# Patient Record
Sex: FEMALE | Race: WHITE | NOT HISPANIC OR LATINO | Employment: OTHER | URBAN - METROPOLITAN AREA
[De-identification: names, ages, dates, MRNs, and addresses within clinical notes are randomized per-mention and may not be internally consistent; named-entity substitution may affect disease eponyms.]

---

## 2017-05-15 ENCOUNTER — FOLLOW UP (OUTPATIENT)
Dept: URBAN - METROPOLITAN AREA CLINIC 26 | Facility: CLINIC | Age: 72
End: 2017-05-15

## 2017-05-15 VITALS — SYSTOLIC BLOOD PRESSURE: 131 MMHG | HEIGHT: 55 IN | HEART RATE: 71 BPM | DIASTOLIC BLOOD PRESSURE: 82 MMHG

## 2017-05-15 DIAGNOSIS — E11.3313: ICD-10-CM

## 2017-05-15 DIAGNOSIS — H40.013: ICD-10-CM

## 2017-05-15 DIAGNOSIS — H43.811: ICD-10-CM

## 2017-05-15 DIAGNOSIS — Z79.4: ICD-10-CM

## 2017-05-15 DIAGNOSIS — H35.3132: ICD-10-CM

## 2017-05-15 DIAGNOSIS — H43.393: ICD-10-CM

## 2017-05-15 PROCEDURE — 1036F TOBACCO NON-USER: CPT

## 2017-05-15 PROCEDURE — G8427 DOCREV CUR MEDS BY ELIG CLIN: HCPCS

## 2017-05-15 PROCEDURE — 92250 FUNDUS PHOTOGRAPHY W/I&R: CPT

## 2017-05-15 PROCEDURE — 92014 COMPRE OPH EXAM EST PT 1/>: CPT

## 2017-05-15 PROCEDURE — 2019F DILATED MACUL EXAM DONE: CPT

## 2017-05-15 PROCEDURE — 92134 CPTRZ OPH DX IMG PST SGM RTA: CPT

## 2017-05-15 PROCEDURE — G8397 DIL MACULA/FUNDUS EXAM/W DOC: HCPCS

## 2017-05-15 PROCEDURE — 2021F DILAT MACULAR EXAM DONE: CPT

## 2017-05-15 PROCEDURE — 5010F MACUL RESULT PHY/QHP MNG DM: CPT

## 2017-05-15 PROCEDURE — 4177F TALK PT/CRGVR RE AREDS PREV: CPT

## 2017-05-15 PROCEDURE — 2022F DILAT RTA XM EVC RTNOPTHY: CPT

## 2017-05-15 ASSESSMENT — TONOMETRY
OS_IOP_MMHG: 12
OD_IOP_MMHG: 16

## 2017-05-15 ASSESSMENT — VISUAL ACUITY
OS_CC: 20/30-
OD_CC: 20/30-

## 2017-05-22 NOTE — PROCEDURE NOTE: CLINICAL
Prior to treatment, the risks/benefits/alternatives were discussed. The patient wished to proceed with procedure. Temporary collagen plugs were inserted. Patient tolerated procedure well. There were no complications. Post procedure instructions given.  0.4mm Kerri GOODWIN

## 2017-11-16 ENCOUNTER — FOLLOW UP (OUTPATIENT)
Dept: URBAN - METROPOLITAN AREA CLINIC 26 | Facility: CLINIC | Age: 72
End: 2017-11-16

## 2017-11-16 VITALS
HEIGHT: 61 IN | SYSTOLIC BLOOD PRESSURE: 180 MMHG | BODY MASS INDEX: 35.87 KG/M2 | DIASTOLIC BLOOD PRESSURE: 76 MMHG | HEART RATE: 75 BPM | WEIGHT: 190 LBS

## 2017-11-16 DIAGNOSIS — H35.3132: ICD-10-CM

## 2017-11-16 DIAGNOSIS — H43.393: ICD-10-CM

## 2017-11-16 DIAGNOSIS — H43.811: ICD-10-CM

## 2017-11-16 DIAGNOSIS — H40.013: ICD-10-CM

## 2017-11-16 DIAGNOSIS — Z79.4: ICD-10-CM

## 2017-11-16 DIAGNOSIS — E11.3313: ICD-10-CM

## 2017-11-16 PROCEDURE — 4177F TALK PT/CRGVR RE AREDS PREV: CPT

## 2017-11-16 PROCEDURE — 92250 FUNDUS PHOTOGRAPHY W/I&R: CPT

## 2017-11-16 PROCEDURE — G8427 DOCREV CUR MEDS BY ELIG CLIN: HCPCS

## 2017-11-16 PROCEDURE — 5010F MACUL RESULT PHY/QHP MNG DM: CPT

## 2017-11-16 PROCEDURE — 2021F DILAT MACULAR EXAM DONE: CPT

## 2017-11-16 PROCEDURE — 1036F TOBACCO NON-USER: CPT

## 2017-11-16 PROCEDURE — G8417 CALC BMI ABV UP PARAM F/U: HCPCS

## 2017-11-16 PROCEDURE — 92014 COMPRE OPH EXAM EST PT 1/>: CPT

## 2017-11-16 PROCEDURE — G8397 DIL MACULA/FUNDUS EXAM/W DOC: HCPCS

## 2017-11-16 PROCEDURE — 2019F DILATED MACUL EXAM DONE: CPT

## 2017-11-16 PROCEDURE — 2022F DILAT RTA XM EVC RTNOPTHY: CPT

## 2017-11-16 PROCEDURE — 92134 CPTRZ OPH DX IMG PST SGM RTA: CPT

## 2017-11-16 ASSESSMENT — VISUAL ACUITY
OS_CC: 20/25-1
OD_CC: 20/25-1

## 2017-11-16 ASSESSMENT — TONOMETRY
OS_IOP_MMHG: 19
OD_IOP_MMHG: 20

## 2018-05-21 NOTE — PROCEDURE NOTE: CLINICAL
PROCEDURE NOTE: Punctal Plugs, Lily Kidd ([id number], [id number]) #2 Bilateral Lower Lids. Diagnosis: RENATO. Quintess 0.4MM BLL.

## 2018-09-11 ENCOUNTER — FOLLOW UP (OUTPATIENT)
Dept: URBAN - METROPOLITAN AREA CLINIC 26 | Facility: CLINIC | Age: 73
End: 2018-09-11

## 2018-09-11 VITALS
WEIGHT: 197 LBS | DIASTOLIC BLOOD PRESSURE: 78 MMHG | SYSTOLIC BLOOD PRESSURE: 136 MMHG | HEART RATE: 71 BPM | BODY MASS INDEX: 37.19 KG/M2 | HEIGHT: 61 IN

## 2018-09-11 DIAGNOSIS — H35.3112: ICD-10-CM

## 2018-09-11 DIAGNOSIS — H43.393: ICD-10-CM

## 2018-09-11 DIAGNOSIS — H35.3221: ICD-10-CM

## 2018-09-11 DIAGNOSIS — H04.123: ICD-10-CM

## 2018-09-11 DIAGNOSIS — H43.811: ICD-10-CM

## 2018-09-11 DIAGNOSIS — E11.3313: ICD-10-CM

## 2018-09-11 DIAGNOSIS — H02.836: ICD-10-CM

## 2018-09-11 DIAGNOSIS — H02.833: ICD-10-CM

## 2018-09-11 DIAGNOSIS — H40.013: ICD-10-CM

## 2018-09-11 DIAGNOSIS — Z79.4: ICD-10-CM

## 2018-09-11 PROCEDURE — 92250 FUNDUS PHOTOGRAPHY W/I&R: CPT

## 2018-09-11 PROCEDURE — 67028 INJECTION EYE DRUG: CPT

## 2018-09-11 PROCEDURE — 92014 COMPRE OPH EXAM EST PT 1/>: CPT

## 2018-09-11 PROCEDURE — 92134 CPTRZ OPH DX IMG PST SGM RTA: CPT

## 2018-09-11 PROCEDURE — 92071 CONTACT LENS FITTING FOR TX: CPT

## 2018-09-11 ASSESSMENT — TONOMETRY
OS_IOP_MMHG: 15
OD_IOP_MMHG: 15

## 2018-09-11 ASSESSMENT — VISUAL ACUITY
OD_PH: 20/25-2
OD_SC: 20/40+2
OS_SC: 20/30-2

## 2018-09-12 ENCOUNTER — PROCEDURE ONLY (OUTPATIENT)
Dept: URBAN - METROPOLITAN AREA CLINIC 26 | Facility: CLINIC | Age: 73
End: 2018-09-12

## 2018-09-12 DIAGNOSIS — E11.3313: ICD-10-CM

## 2018-09-12 DIAGNOSIS — H40.013: ICD-10-CM

## 2018-09-12 DIAGNOSIS — H43.393: ICD-10-CM

## 2018-09-12 DIAGNOSIS — H02.836: ICD-10-CM

## 2018-09-12 DIAGNOSIS — H43.811: ICD-10-CM

## 2018-09-12 DIAGNOSIS — H35.3221: ICD-10-CM

## 2018-09-12 DIAGNOSIS — Z79.4: ICD-10-CM

## 2018-09-12 DIAGNOSIS — H04.123: ICD-10-CM

## 2018-09-12 DIAGNOSIS — H02.833: ICD-10-CM

## 2018-09-12 DIAGNOSIS — H35.3112: ICD-10-CM

## 2018-09-12 PROCEDURE — 99211 OFF/OP EST MAY X REQ PHY/QHP: CPT

## 2018-09-12 ASSESSMENT — VISUAL ACUITY: OS_SC: 20/30-2

## 2018-10-15 ENCOUNTER — FOLLOW UP (OUTPATIENT)
Dept: URBAN - METROPOLITAN AREA CLINIC 26 | Facility: CLINIC | Age: 73
End: 2018-10-15

## 2018-10-15 DIAGNOSIS — H43.811: ICD-10-CM

## 2018-10-15 DIAGNOSIS — H04.123: ICD-10-CM

## 2018-10-15 DIAGNOSIS — Z79.4: ICD-10-CM

## 2018-10-15 DIAGNOSIS — H43.393: ICD-10-CM

## 2018-10-15 DIAGNOSIS — E11.3313: ICD-10-CM

## 2018-10-15 DIAGNOSIS — H35.3221: ICD-10-CM

## 2018-10-15 DIAGNOSIS — H35.3112: ICD-10-CM

## 2018-10-15 DIAGNOSIS — H40.013: ICD-10-CM

## 2018-10-15 PROCEDURE — 92250 FUNDUS PHOTOGRAPHY W/I&R: CPT

## 2018-10-15 PROCEDURE — 67028 INJECTION EYE DRUG: CPT

## 2018-10-15 PROCEDURE — 92014 COMPRE OPH EXAM EST PT 1/>: CPT

## 2018-10-15 PROCEDURE — 92134 CPTRZ OPH DX IMG PST SGM RTA: CPT

## 2018-10-15 PROCEDURE — 92071 CONTACT LENS FITTING FOR TX: CPT

## 2018-10-15 ASSESSMENT — VISUAL ACUITY
OS_CC: 20/25
OD_CC: 20/30

## 2018-10-15 ASSESSMENT — TONOMETRY
OS_IOP_MMHG: 14
OD_IOP_MMHG: 13

## 2018-10-16 ENCOUNTER — CLINIC PROCEDURE ONLY (OUTPATIENT)
Dept: URBAN - METROPOLITAN AREA CLINIC 26 | Facility: CLINIC | Age: 73
End: 2018-10-16

## 2018-10-16 DIAGNOSIS — H35.3112: ICD-10-CM

## 2018-10-16 DIAGNOSIS — H43.811: ICD-10-CM

## 2018-10-16 DIAGNOSIS — H43.393: ICD-10-CM

## 2018-10-16 DIAGNOSIS — H35.3221: ICD-10-CM

## 2018-10-16 DIAGNOSIS — Z79.4: ICD-10-CM

## 2018-10-16 DIAGNOSIS — H40.013: ICD-10-CM

## 2018-10-16 DIAGNOSIS — E11.3313: ICD-10-CM

## 2018-10-16 PROCEDURE — 99212 OFFICE O/P EST SF 10 MIN: CPT

## 2018-10-16 ASSESSMENT — VISUAL ACUITY: OS_CC: 20/30-2

## 2018-11-19 NOTE — PROCEDURE NOTE: CLINICAL
PROCEDURE NOTE: Punctal Plugs, Wilfrid Verma ([id number], [id number]) #2 Bilateral Lower Lids. Diagnosis: RENATO. i gtt Proparacaine OU; i gtt Moxifloxacin OU 0.5mm BLL.

## 2018-12-03 ENCOUNTER — CLINICAL PROCEDURE AND DIAGNOSTIC TESTING ONLY (OUTPATIENT)
Dept: URBAN - METROPOLITAN AREA CLINIC 26 | Facility: CLINIC | Age: 73
End: 2018-12-03

## 2018-12-03 DIAGNOSIS — H35.3112: ICD-10-CM

## 2018-12-03 DIAGNOSIS — H04.123: ICD-10-CM

## 2018-12-03 DIAGNOSIS — H35.3221: ICD-10-CM

## 2018-12-03 PROCEDURE — 67028 INJECTION EYE DRUG: CPT

## 2018-12-03 PROCEDURE — 92071 CONTACT LENS FITTING FOR TX: CPT

## 2018-12-03 PROCEDURE — 92250 FUNDUS PHOTOGRAPHY W/I&R: CPT

## 2018-12-03 ASSESSMENT — TONOMETRY: OS_IOP_MMHG: 15

## 2018-12-03 ASSESSMENT — VISUAL ACUITY: OS_CC: 20/25-1

## 2018-12-04 ENCOUNTER — PROCEDURE ONLY (OUTPATIENT)
Dept: URBAN - METROPOLITAN AREA CLINIC 26 | Facility: CLINIC | Age: 73
End: 2018-12-04

## 2018-12-04 DIAGNOSIS — E11.3313: ICD-10-CM

## 2018-12-04 DIAGNOSIS — Z79.4: ICD-10-CM

## 2018-12-04 DIAGNOSIS — H35.3112: ICD-10-CM

## 2018-12-04 DIAGNOSIS — H02.833: ICD-10-CM

## 2018-12-04 DIAGNOSIS — H43.811: ICD-10-CM

## 2018-12-04 DIAGNOSIS — H04.123: ICD-10-CM

## 2018-12-04 DIAGNOSIS — H35.3221: ICD-10-CM

## 2018-12-04 DIAGNOSIS — H02.836: ICD-10-CM

## 2018-12-04 DIAGNOSIS — H40.013: ICD-10-CM

## 2018-12-04 DIAGNOSIS — H43.393: ICD-10-CM

## 2018-12-04 PROCEDURE — 99212 OFFICE O/P EST SF 10 MIN: CPT

## 2018-12-04 ASSESSMENT — VISUAL ACUITY: OS_CC: 20/30-2

## 2019-01-28 ENCOUNTER — FOLLOW UP (OUTPATIENT)
Dept: URBAN - METROPOLITAN AREA CLINIC 26 | Facility: CLINIC | Age: 74
End: 2019-01-28

## 2019-01-28 DIAGNOSIS — H04.123: ICD-10-CM

## 2019-01-28 DIAGNOSIS — H43.811: ICD-10-CM

## 2019-01-28 DIAGNOSIS — E11.3313: ICD-10-CM

## 2019-01-28 DIAGNOSIS — H43.393: ICD-10-CM

## 2019-01-28 DIAGNOSIS — Z79.4: ICD-10-CM

## 2019-01-28 DIAGNOSIS — H02.836: ICD-10-CM

## 2019-01-28 DIAGNOSIS — H35.3112: ICD-10-CM

## 2019-01-28 DIAGNOSIS — H02.833: ICD-10-CM

## 2019-01-28 DIAGNOSIS — H40.013: ICD-10-CM

## 2019-01-28 DIAGNOSIS — H35.3221: ICD-10-CM

## 2019-01-28 PROCEDURE — 92071 CONTACT LENS FITTING FOR TX: CPT

## 2019-01-28 PROCEDURE — 92134 CPTRZ OPH DX IMG PST SGM RTA: CPT

## 2019-01-28 PROCEDURE — 67028 INJECTION EYE DRUG: CPT

## 2019-01-28 PROCEDURE — 92014 COMPRE OPH EXAM EST PT 1/>: CPT

## 2019-01-28 PROCEDURE — 92250 FUNDUS PHOTOGRAPHY W/I&R: CPT

## 2019-01-28 ASSESSMENT — VISUAL ACUITY
OS_CC: 20/25-2
OD_CC: 20/25-2

## 2019-01-28 ASSESSMENT — TONOMETRY
OD_IOP_MMHG: 14
OS_IOP_MMHG: 14

## 2019-04-01 ENCOUNTER — FOLLOW UP AND POST INJECTION EVALUATION (OUTPATIENT)
Dept: URBAN - METROPOLITAN AREA CLINIC 26 | Facility: CLINIC | Age: 74
End: 2019-04-01

## 2019-04-01 DIAGNOSIS — E11.3313: ICD-10-CM

## 2019-04-01 DIAGNOSIS — H04.123: ICD-10-CM

## 2019-04-01 DIAGNOSIS — H35.3221: ICD-10-CM

## 2019-04-01 DIAGNOSIS — H35.3112: ICD-10-CM

## 2019-04-01 PROCEDURE — 67028 INJECTION EYE DRUG: CPT

## 2019-04-01 PROCEDURE — 92071 CONTACT LENS FITTING FOR TX: CPT

## 2019-04-01 PROCEDURE — 92134 CPTRZ OPH DX IMG PST SGM RTA: CPT

## 2019-04-01 PROCEDURE — 92250 FUNDUS PHOTOGRAPHY W/I&R: CPT

## 2019-04-01 ASSESSMENT — VISUAL ACUITY: OS_CC: 20/30

## 2019-04-01 ASSESSMENT — TONOMETRY: OS_IOP_MMHG: 18

## 2019-05-20 NOTE — PROCEDURE NOTE: CLINICAL
PROCEDURE NOTE: Punctal Plugs, Seb Lpoez ([id number], [id number]) #2 Bilateral Lower Lids. Diagnosis: RENATO.

## 2019-06-03 ENCOUNTER — CLINICAL PROCEDURE AND DIAGNOSTIC TESTING ONLY (OUTPATIENT)
Dept: URBAN - METROPOLITAN AREA CLINIC 26 | Facility: CLINIC | Age: 74
End: 2019-06-03

## 2019-06-03 DIAGNOSIS — H35.3112: ICD-10-CM

## 2019-06-03 DIAGNOSIS — H35.3221: ICD-10-CM

## 2019-06-03 PROCEDURE — 92250 FUNDUS PHOTOGRAPHY W/I&R: CPT

## 2019-06-03 PROCEDURE — 67028 INJECTION EYE DRUG: CPT

## 2019-06-03 PROCEDURE — 92134 CPTRZ OPH DX IMG PST SGM RTA: CPT

## 2019-06-03 ASSESSMENT — VISUAL ACUITY: OS_CC: 20/30+2

## 2019-06-03 ASSESSMENT — TONOMETRY: OS_IOP_MMHG: 12

## 2019-08-08 ENCOUNTER — FOLLOW UP (OUTPATIENT)
Dept: URBAN - METROPOLITAN AREA CLINIC 26 | Facility: CLINIC | Age: 74
End: 2019-08-08

## 2019-08-08 DIAGNOSIS — H43.393: ICD-10-CM

## 2019-08-08 DIAGNOSIS — Z79.4: ICD-10-CM

## 2019-08-08 DIAGNOSIS — H40.013: ICD-10-CM

## 2019-08-08 DIAGNOSIS — H35.3221: ICD-10-CM

## 2019-08-08 DIAGNOSIS — H43.811: ICD-10-CM

## 2019-08-08 DIAGNOSIS — H35.3112: ICD-10-CM

## 2019-08-08 DIAGNOSIS — E11.3313: ICD-10-CM

## 2019-08-08 PROCEDURE — 92014 COMPRE OPH EXAM EST PT 1/>: CPT

## 2019-08-08 PROCEDURE — 92134 CPTRZ OPH DX IMG PST SGM RTA: CPT

## 2019-08-08 PROCEDURE — 92250 FUNDUS PHOTOGRAPHY W/I&R: CPT

## 2019-08-08 PROCEDURE — 67028 INJECTION EYE DRUG: CPT

## 2019-08-08 ASSESSMENT — TONOMETRY
OD_IOP_MMHG: 18
OS_IOP_MMHG: 19

## 2019-08-08 ASSESSMENT — VISUAL ACUITY
OS_CC: 20/30
OD_PH: 20/30
OD_CC: 20/40

## 2019-10-03 ENCOUNTER — FOLLOW UP AND POST INJECTION EVALUATION (OUTPATIENT)
Dept: URBAN - METROPOLITAN AREA CLINIC 26 | Facility: CLINIC | Age: 74
End: 2019-10-03

## 2019-10-03 DIAGNOSIS — H40.013: ICD-10-CM

## 2019-10-03 DIAGNOSIS — Z79.4: ICD-10-CM

## 2019-10-03 DIAGNOSIS — H43.811: ICD-10-CM

## 2019-10-03 DIAGNOSIS — H35.3231: ICD-10-CM

## 2019-10-03 DIAGNOSIS — H43.393: ICD-10-CM

## 2019-10-03 DIAGNOSIS — E11.3313: ICD-10-CM

## 2019-10-03 PROCEDURE — 92250 FUNDUS PHOTOGRAPHY W/I&R: CPT

## 2019-10-03 PROCEDURE — 92134 CPTRZ OPH DX IMG PST SGM RTA: CPT

## 2019-10-03 PROCEDURE — 67028 INJECTION EYE DRUG: CPT

## 2019-10-03 PROCEDURE — 92014 COMPRE OPH EXAM EST PT 1/>: CPT

## 2019-10-03 PROCEDURE — J2778* LUCENTIS 0.1MG

## 2019-10-03 ASSESSMENT — VISUAL ACUITY: OS_CC: 20/30-2

## 2019-10-03 ASSESSMENT — TONOMETRY: OS_IOP_MMHG: 15

## 2019-11-20 NOTE — PROCEDURE NOTE: CLINICAL
PROCEDURE NOTE: Punctal Plugs, Magen Buckley ([id number], [id number]) #2 Bilateral Lower Lids. Diagnosis: RENATO. 0.5mm QUINTESS BLL.

## 2019-11-26 ENCOUNTER — CLINICAL PROCEDURE AND DIAGNOSTIC TESTING ONLY (OUTPATIENT)
Dept: URBAN - METROPOLITAN AREA CLINIC 26 | Facility: CLINIC | Age: 74
End: 2019-11-26

## 2019-11-26 DIAGNOSIS — H35.3231: ICD-10-CM

## 2019-11-26 PROCEDURE — 92134 CPTRZ OPH DX IMG PST SGM RTA: CPT

## 2019-11-26 PROCEDURE — J2778* LUCENTIS 0.1MG

## 2019-11-26 PROCEDURE — 67028 INJECTION EYE DRUG: CPT

## 2019-11-26 PROCEDURE — 92250 FUNDUS PHOTOGRAPHY W/I&R: CPT

## 2019-11-26 ASSESSMENT — VISUAL ACUITY
OS_CC: 20/30-2
OD_CC: 20/30-1

## 2019-11-26 ASSESSMENT — TONOMETRY
OS_IOP_MMHG: 15
OD_IOP_MMHG: 16

## 2020-01-09 ENCOUNTER — FOLLOW UP AND POST INJECTION EVALUATION (OUTPATIENT)
Dept: URBAN - METROPOLITAN AREA CLINIC 26 | Facility: CLINIC | Age: 75
End: 2020-01-09

## 2020-01-09 VITALS — HEIGHT: 61 IN | WEIGHT: 185 LBS | BODY MASS INDEX: 34.93 KG/M2

## 2020-01-09 DIAGNOSIS — H35.3231: ICD-10-CM

## 2020-01-09 DIAGNOSIS — H43.811: ICD-10-CM

## 2020-01-09 DIAGNOSIS — Z79.4: ICD-10-CM

## 2020-01-09 DIAGNOSIS — H43.393: ICD-10-CM

## 2020-01-09 DIAGNOSIS — H40.013: ICD-10-CM

## 2020-01-09 DIAGNOSIS — E11.3313: ICD-10-CM

## 2020-01-09 PROCEDURE — 67028 INJECTION EYE DRUG: CPT

## 2020-01-09 PROCEDURE — J2778* LUCENTIS 0.1MG

## 2020-01-09 PROCEDURE — 92014 COMPRE OPH EXAM EST PT 1/>: CPT

## 2020-01-09 PROCEDURE — 92250 FUNDUS PHOTOGRAPHY W/I&R: CPT

## 2020-01-09 PROCEDURE — 92134 CPTRZ OPH DX IMG PST SGM RTA: CPT

## 2020-01-09 ASSESSMENT — VISUAL ACUITY
OD_CC: 20/20-2
OS_CC: 20/25-2

## 2020-01-09 ASSESSMENT — TONOMETRY
OS_IOP_MMHG: 14
OD_IOP_MMHG: 16

## 2020-02-27 ENCOUNTER — CLINICAL PROCEDURE AND DIAGNOSTIC TESTING ONLY (OUTPATIENT)
Dept: URBAN - METROPOLITAN AREA CLINIC 26 | Facility: CLINIC | Age: 75
End: 2020-02-27

## 2020-02-27 DIAGNOSIS — E11.3313: ICD-10-CM

## 2020-02-27 DIAGNOSIS — H35.3231: ICD-10-CM

## 2020-02-27 PROCEDURE — 92134 CPTRZ OPH DX IMG PST SGM RTA: CPT

## 2020-02-27 PROCEDURE — J2778* LUCENTIS 0.1MG

## 2020-02-27 PROCEDURE — 92250 FUNDUS PHOTOGRAPHY W/I&R: CPT

## 2020-02-27 PROCEDURE — 67028 INJECTION EYE DRUG: CPT

## 2020-02-27 ASSESSMENT — VISUAL ACUITY
OD_CC: 20/25-2
OS_CC: 20/30-1

## 2020-02-27 ASSESSMENT — TONOMETRY
OD_IOP_MMHG: 18
OS_IOP_MMHG: 17

## 2020-03-09 ENCOUNTER — FOLLOW UP AND POST INJECTION EVALUATION (OUTPATIENT)
Dept: URBAN - METROPOLITAN AREA CLINIC 26 | Facility: CLINIC | Age: 75
End: 2020-03-09

## 2020-03-09 DIAGNOSIS — H43.811: ICD-10-CM

## 2020-03-09 DIAGNOSIS — H02.833: ICD-10-CM

## 2020-03-09 DIAGNOSIS — H43.393: ICD-10-CM

## 2020-03-09 DIAGNOSIS — H02.836: ICD-10-CM

## 2020-03-09 DIAGNOSIS — H57.13: ICD-10-CM

## 2020-03-09 DIAGNOSIS — H35.3231: ICD-10-CM

## 2020-03-09 DIAGNOSIS — H04.123: ICD-10-CM

## 2020-03-09 DIAGNOSIS — H40.013: ICD-10-CM

## 2020-03-09 DIAGNOSIS — Z79.4: ICD-10-CM

## 2020-03-09 DIAGNOSIS — E11.3313: ICD-10-CM

## 2020-03-09 PROCEDURE — 92012 INTRM OPH EXAM EST PATIENT: CPT

## 2020-03-09 ASSESSMENT — VISUAL ACUITY
OS_CC: 20/30+2
OD_CC: 20/25-2

## 2020-05-20 NOTE — PROCEDURE NOTE: CLINICAL
PROCEDURE NOTE: Punctal Plugs, Heather Mejia ([id number], [id number]) #2 Bilateral Lower Lids. Prior to treatment, the risks/benefits/alternatives were discussed. The patient wished to proceed with procedure. Temporary collagen plugs were inserted. Patient tolerated procedure well. There were no complications. Post procedure instructions given. Kerri 0.4mm BLL.

## 2020-10-06 ENCOUNTER — OFFICE VISIT (OUTPATIENT)
Dept: URBAN - METROPOLITAN AREA CLINIC 7 | Facility: CLINIC | Age: 75
End: 2020-10-06

## 2020-11-04 ENCOUNTER — OFFICE VISIT (OUTPATIENT)
Dept: URBAN - METROPOLITAN AREA SURGERY CENTER 5 | Facility: SURGERY CENTER | Age: 75
End: 2020-11-04

## 2020-12-08 NOTE — PROCEDURE NOTE: CLINICAL
PROCEDURE NOTE: Punctal Plugs, Lily Kidd ([id number], [id number]) #2 Bilateral Lower Lids. Prior to treatment, the risks/benefits/alternatives were discussed. The patient wished to proceed with procedure. Temporary collagen plugs were inserted. Patient tolerated procedure well. There were no complications. Post procedure instructions given. 0.4mm BLL Quintess.

## 2021-05-13 NOTE — PROCEDURE NOTE: CLINICAL
PROCEDURE NOTE: Punctal Plugs, Vamshi Jeremiah ([id number], [id number]) #2 Prior to treatment, the risks/benefits/alternatives were discussed. The patient wished to proceed with procedure. Temporary collagen plugs were inserted. Patient tolerated procedure well. There were no complications. Post procedure instructions given. 0.4 Ethans YANETL.

## 2021-05-13 NOTE — PATIENT DISCUSSION
3 weeks after receiving Covid Vaccine (Fariba Perdue, first dose), pt experienced a flash, double vision that lasted 2 min. and a HA/dizziness.  No ocular findings on today's complete exam.  Pt not currently experiencing diplopia. 136.1

## 2021-05-13 NOTE — PATIENT DISCUSSION
Can't necessarily attribute pt's symptoms to Covid Vaccine but pt should talk to PCP if she has concerns over getting 2nd dose.  She reports having a skin reaction exactly one week after first dose.

## 2021-05-13 NOTE — PATIENT DISCUSSION
Pt will report findings to her PCP as well.  She is unsure if BP was affected at that time but states that her BP is usually low.

## 2021-10-15 NOTE — PATIENT DISCUSSION
3 weeks after receiving Covid Vaccine (Faustina Momin, first dose), pt experienced a flash, double vision that lasted 2 min. and a HA/dizziness.  No ocular findings on today's complete exam.  Pt not currently experiencing diplopia.

## 2021-11-22 NOTE — PROCEDURE NOTE: CLINICAL
PROCEDURE NOTE: Punctal Plugs, Toña Cohen ([id number], [id number]) #2 Bilateral Lower Lids. Prior to treatment, the risks/benefits/alternatives were discussed. The patient wished to proceed with procedure. Temporary collagen plugs were inserted. Patient tolerated procedure well. There were no complications. Post procedure instructions given. 0.4mm QUINTESS BLL.

## 2021-11-22 NOTE — PATIENT DISCUSSION
3 weeks after receiving Covid Vaccine (Chon Mendoza, first dose), pt experienced a flash, double vision that lasted 2 min. and a HA/dizziness.  No ocular findings on today's complete exam.  Pt not currently experiencing diplopia.

## 2022-05-11 NOTE — PATIENT DISCUSSION
3 weeks after receiving Covid Vaccine (Elizabeth Ryder, first dose), pt experienced a flash, double vision that lasted 2 min. and a HA/dizziness.  No ocular findings on today's complete exam.  Pt not currently experiencing diplopia.

## 2022-05-11 NOTE — PROCEDURE NOTE: CLINICAL
PROCEDURE NOTE: Punctal Plugs, Haileystarr Chasemehdi ([id number], [id number]) #2 Bilateral Lower Lids. Prior to treatment, the risks/benefits/alternatives were discussed. The patient wished to proceed with procedure. Temporary collagen plugs were inserted. Patient tolerated procedure well. There were no complications. Post procedure instructions given. Kerri 0.4mm BLL.

## 2022-07-30 ENCOUNTER — TELEPHONE ENCOUNTER (OUTPATIENT)
Age: 77
End: 2022-07-30

## 2022-07-31 ENCOUNTER — TELEPHONE ENCOUNTER (OUTPATIENT)
Age: 77
End: 2022-07-31

## 2022-10-17 NOTE — PATIENT DISCUSSION
Worsening; recommend pt see optometrist in [place] when she moves to get recommendation for cataract surgery.

## 2022-10-17 NOTE — PROCEDURE NOTE: CLINICAL
PROCEDURE NOTE: Punctal Plugs, Deborah Ken ([id number], [id number]) #2 Bilateral Lower Lids. Diagnosis: Keratoconjunctivitis Sicca, Not Specified As Sjögren's. Anesthesia: Proparacaine 0.5%. Prior to treatment, the risks/benefits/alternatives were discussed. The patient wished to proceed with procedure. Temporary collagen plugs were inserted. Patient tolerated procedure well. There were no complications. Post procedure instructions given. Kye Gan

## 2024-03-19 NOTE — PATIENT DISCUSSION
Please review the attached handouts, follow up with your PCP  Prescriptions for antibiotics were sent to the pharmacy. The amoxicillin will be taken orally twice per day and the erythromycin will be applied to the eyes once per night.  Continue to use the albuterol inhaler: 2 puffs every 4 hours while awake  Return to the ER for worsening shortness of breath, fevers, difficulty maintaining oral intake, or any other concerning symptoms.    Warm Compresses.

## 2025-03-27 NOTE — PATIENT DISCUSSION
Recommended observation. [No, patient denies ideation or behavior] : No, patient denies ideation or behavior [Low acute suicide risk] : Low acute suicide risk [No] : No

## 2025-06-04 NOTE — PATIENT DISCUSSION
Can't necessarily attribute pt's symptoms to Covid Vaccine but pt should talk to PCP if she has concerns over getting 2nd dose.  She reports having a skin reaction exactly one week after first dose. contact guard